# Patient Record
Sex: FEMALE | URBAN - NONMETROPOLITAN AREA
[De-identification: names, ages, dates, MRNs, and addresses within clinical notes are randomized per-mention and may not be internally consistent; named-entity substitution may affect disease eponyms.]

---

## 2022-10-11 ENCOUNTER — OFFICE VISIT (OUTPATIENT)
Dept: URBAN - NONMETROPOLITAN AREA CLINIC 14 | Facility: CLINIC | Age: 77
End: 2022-10-11
Payer: MEDICARE

## 2022-10-11 DIAGNOSIS — H01.004 UNSPECIFIED BLEPHARITIS OF LEFT UPPER EYELID: ICD-10-CM

## 2022-10-11 DIAGNOSIS — H25.813 COMBINED FORMS OF AGE-RELATED CATARACT, BILATERAL: ICD-10-CM

## 2022-10-11 DIAGNOSIS — Z79.84 LONG TERM (CURRENT) USE OF ORAL ANTIDIABETIC DRUGS: ICD-10-CM

## 2022-10-11 DIAGNOSIS — E11.9 DIABETES MELLITUS TYPE 2 WITHOUT MENTION OF COMPLICATION: Primary | ICD-10-CM

## 2022-10-11 PROCEDURE — 99204 OFFICE O/P NEW MOD 45 MIN: CPT | Performed by: OPTOMETRIST

## 2022-10-11 ASSESSMENT — VISUAL ACUITY
OD: 20/100
OS: 20/40

## 2022-10-11 ASSESSMENT — INTRAOCULAR PRESSURE
OS: 13
OD: 15

## 2022-10-11 NOTE — IMPRESSION/PLAN
Impression: Combined forms of age-related cataract, bilateral: H25.813. Plan: Refer for CE OU with Dr Jairo Lezama. Educated pt. on risks/benefits of CE. Pt. elects to proceed with CE at this time.

## 2022-10-11 NOTE — IMPRESSION/PLAN
Impression: Diabetes mellitus Type 2 without mention of complication: T07.8. Plan: Monitor x 1 year. Educated pt. on need for tight BG control, compliance with medication, and regular F/U with PCP.

## 2022-10-11 NOTE — IMPRESSION/PLAN
Impression: Unspecified blepharitis of left upper eyelid: H01.004. Plan: Start lid scrubs BID OU. RTC with increased symptoms.

## 2022-11-18 ENCOUNTER — PRE-OPERATIVE VISIT (OUTPATIENT)
Dept: URBAN - NONMETROPOLITAN AREA CLINIC 13 | Facility: CLINIC | Age: 77
End: 2022-11-18
Payer: MEDICARE

## 2022-11-18 DIAGNOSIS — H25.813 COMBINED FORMS OF AGE-RELATED CATARACT, BILATERAL: Primary | ICD-10-CM

## 2022-11-18 DIAGNOSIS — H25.812 COMBINED FORMS OF AGE-RELATED CATARACT, LEFT EYE: ICD-10-CM

## 2022-11-18 PROCEDURE — 99204 OFFICE O/P NEW MOD 45 MIN: CPT | Performed by: OPHTHALMOLOGY

## 2022-11-18 ASSESSMENT — INTRAOCULAR PRESSURE
OD: 15
OS: 15

## 2022-11-18 ASSESSMENT — VISUAL ACUITY
OD: 20/100
OS: 20/40

## 2022-11-18 NOTE — IMPRESSION/PLAN
Impression: Combined forms of age-related cataract, bilateral: H25.813. Plan: Cataracts account for patient visual complaints  Glasses do not adequately correct vision. Discussed diagnosis in detail with patient. Discussed treatment options with patient. Patient elects to have surgery. Surgical risks and benefits were discussed, explained and understood by patient.  OD then OS std iol aim plano OD plano OS ok dextenza order ascan/optical biometry for iol calculations

## 2022-11-21 ENCOUNTER — TESTING ONLY (OUTPATIENT)
Dept: URBAN - NONMETROPOLITAN AREA CLINIC 13 | Facility: CLINIC | Age: 77
End: 2022-11-21
Payer: MEDICARE

## 2022-11-21 DIAGNOSIS — H25.813 COMBINED FORMS OF AGE-RELATED CATARACT, BILATERAL: Primary | ICD-10-CM

## 2022-11-21 DIAGNOSIS — Z01.818 ENCOUNTER FOR OTHER PREPROCEDURAL EXAMINATION: Primary | ICD-10-CM

## 2022-11-21 PROCEDURE — 99203 OFFICE O/P NEW LOW 30 MIN: CPT | Performed by: PHYSICIAN ASSISTANT

## 2022-11-28 ENCOUNTER — SURGERY (OUTPATIENT)
Dept: URBAN - NONMETROPOLITAN AREA SURGERY 4 | Facility: SURGERY | Age: 77
End: 2022-11-28
Payer: MEDICARE

## 2022-11-28 DIAGNOSIS — H25.813 COMBINED FORMS OF AGE-RELATED CATARACT, BILATERAL: Primary | ICD-10-CM

## 2022-11-28 PROCEDURE — 66984 XCAPSL CTRC RMVL W/O ECP: CPT | Performed by: OPHTHALMOLOGY

## 2022-11-29 ENCOUNTER — POST-OPERATIVE VISIT (OUTPATIENT)
Dept: URBAN - NONMETROPOLITAN AREA CLINIC 14 | Facility: CLINIC | Age: 77
End: 2022-11-29
Payer: MEDICARE

## 2022-11-29 DIAGNOSIS — Z48.810 ENCOUNTER FOR SURGICAL AFTERCARE FOLLOWING SURGERY ON A SENSE ORGAN: Primary | ICD-10-CM

## 2022-11-29 PROCEDURE — 99024 POSTOP FOLLOW-UP VISIT: CPT | Performed by: OPTOMETRIST

## 2022-11-29 ASSESSMENT — INTRAOCULAR PRESSURE
OD: 20
OS: 15

## 2022-11-29 NOTE — IMPRESSION/PLAN
Impression: S/P Cataract Extraction by phacoemulsification with IOL placement OD - 1 Day. Encounter for surgical aftercare following surgery on a sense organ  Z48.810. Plan: PT doing well. RTC 1 week for F/U or sooner with changes.

## 2022-12-05 ENCOUNTER — POST-OPERATIVE VISIT (OUTPATIENT)
Dept: URBAN - NONMETROPOLITAN AREA CLINIC 14 | Facility: CLINIC | Age: 77
End: 2022-12-05
Payer: MEDICARE

## 2022-12-05 DIAGNOSIS — Z48.810 ENCOUNTER FOR SURGICAL AFTERCARE FOLLOWING SURGERY ON A SENSE ORGAN: Primary | ICD-10-CM

## 2022-12-05 PROCEDURE — 99024 POSTOP FOLLOW-UP VISIT: CPT | Performed by: OPTOMETRIST

## 2022-12-05 ASSESSMENT — VISUAL ACUITY: OD: 20/20

## 2022-12-05 ASSESSMENT — INTRAOCULAR PRESSURE
OD: 12
OS: 10

## 2022-12-05 NOTE — IMPRESSION/PLAN
Impression:  Encounter for surgical aftercare following surgery on a sense organ  Z48.810. Plan: Pt doing well. Proceed with CE OD. RTC with increased symptoms.

## 2022-12-12 ENCOUNTER — SURGERY (OUTPATIENT)
Dept: URBAN - NONMETROPOLITAN AREA SURGERY 4 | Facility: SURGERY | Age: 77
End: 2022-12-12
Payer: MEDICARE

## 2022-12-12 DIAGNOSIS — H25.812 COMBINED FORMS OF AGE-RELATED CATARACT, LEFT EYE: Primary | ICD-10-CM

## 2022-12-12 PROCEDURE — 66984 XCAPSL CTRC RMVL W/O ECP: CPT | Performed by: OPHTHALMOLOGY

## 2022-12-13 ENCOUNTER — POST-OPERATIVE VISIT (OUTPATIENT)
Dept: URBAN - NONMETROPOLITAN AREA CLINIC 14 | Facility: CLINIC | Age: 77
End: 2022-12-13
Payer: MEDICARE

## 2022-12-13 DIAGNOSIS — Z48.810 ENCOUNTER FOR SURGICAL AFTERCARE FOLLOWING SURGERY ON A SENSE ORGAN: Primary | ICD-10-CM

## 2022-12-13 PROCEDURE — 99024 POSTOP FOLLOW-UP VISIT: CPT | Performed by: OPTOMETRIST

## 2022-12-13 ASSESSMENT — INTRAOCULAR PRESSURE
OD: 13
OS: 13

## 2022-12-19 ENCOUNTER — POST-OPERATIVE VISIT (OUTPATIENT)
Dept: URBAN - NONMETROPOLITAN AREA CLINIC 14 | Facility: CLINIC | Age: 77
End: 2022-12-19
Payer: MEDICARE

## 2022-12-19 DIAGNOSIS — Z48.810 ENCOUNTER FOR SURGICAL AFTERCARE FOLLOWING SURGERY ON A SENSE ORGAN: Primary | ICD-10-CM

## 2022-12-19 PROCEDURE — 99024 POSTOP FOLLOW-UP VISIT: CPT | Performed by: OPTOMETRIST

## 2022-12-19 ASSESSMENT — INTRAOCULAR PRESSURE
OS: 12
OD: 11

## 2022-12-19 ASSESSMENT — VISUAL ACUITY
OS: 20/20
OD: 20/20

## 2022-12-19 NOTE — IMPRESSION/PLAN
Impression: S/P Cataract Extraction by phacoemulsification with IOL placement OD - 21 Days. Encounter for surgical aftercare following surgery on a sense organ  Z48.810. Plan: PT doing well. RTC 3 weeks for F/U or sooner with changes.

## 2023-01-09 ENCOUNTER — POST-OPERATIVE VISIT (OUTPATIENT)
Dept: URBAN - NONMETROPOLITAN AREA CLINIC 14 | Facility: CLINIC | Age: 78
End: 2023-01-09
Payer: MEDICARE

## 2023-01-09 DIAGNOSIS — Z48.810 ENCOUNTER FOR SURGICAL AFTERCARE FOLLOWING SURGERY ON A SENSE ORGAN: Primary | ICD-10-CM

## 2023-01-09 PROCEDURE — 99024 POSTOP FOLLOW-UP VISIT: CPT | Performed by: OPTOMETRIST

## 2023-01-09 ASSESSMENT — INTRAOCULAR PRESSURE
OS: 13
OD: 12

## 2023-01-09 ASSESSMENT — VISUAL ACUITY
OD: 20/25
OS: 20/25

## 2023-01-09 NOTE — IMPRESSION/PLAN
Impression:  Encounter for surgical aftercare following surgery on a sense organ  Z48.810. Plan: Pt doing well. RTC 6 months for F/U or sooner with changes. Educated pt. on all findings.

## 2023-07-10 ENCOUNTER — OFFICE VISIT (OUTPATIENT)
Dept: URBAN - NONMETROPOLITAN AREA CLINIC 14 | Facility: CLINIC | Age: 78
End: 2023-07-10
Payer: MEDICARE

## 2023-07-10 DIAGNOSIS — H26.493 OTHER SECONDARY CATARACT, BILATERAL: Primary | ICD-10-CM

## 2023-07-10 PROCEDURE — 99213 OFFICE O/P EST LOW 20 MIN: CPT | Performed by: OPTOMETRIST

## 2023-07-10 ASSESSMENT — INTRAOCULAR PRESSURE
OD: 9
OS: 11

## 2023-07-10 NOTE — IMPRESSION/PLAN
Impression: Other secondary cataract, bilateral: H26.493. Plan: Monitor x 1 year. RTC with any changes to vision.

## 2023-10-30 ENCOUNTER — OFFICE VISIT (OUTPATIENT)
Dept: URBAN - NONMETROPOLITAN AREA CLINIC 14 | Facility: CLINIC | Age: 78
End: 2023-10-30
Payer: MEDICARE

## 2023-10-30 DIAGNOSIS — S05.02XA CORNEAL ABRASION OF LEFT EYE, INITIAL ENCOUNTER: Primary | ICD-10-CM

## 2023-10-30 PROCEDURE — 99213 OFFICE O/P EST LOW 20 MIN: CPT | Performed by: OPTOMETRIST

## 2023-10-30 RX ORDER — OFLOXACIN 3 MG/ML
0.3 % SOLUTION/ DROPS OPHTHALMIC
Qty: 5 | Refills: 0 | Status: ACTIVE
Start: 2023-10-30

## 2023-10-30 ASSESSMENT — INTRAOCULAR PRESSURE
OS: 12
OD: 13